# Patient Record
Sex: FEMALE | URBAN - METROPOLITAN AREA
[De-identification: names, ages, dates, MRNs, and addresses within clinical notes are randomized per-mention and may not be internally consistent; named-entity substitution may affect disease eponyms.]

---

## 2023-04-12 PROBLEM — M54.40 BACK PAIN OF LUMBAR REGION WITH SCIATICA: Status: ACTIVE | Noted: 2023-04-12

## 2023-04-12 PROBLEM — Z87.2 HISTORY OF URTICARIA: Status: ACTIVE | Noted: 2023-04-12

## 2023-08-02 ENCOUNTER — OFFICE VISIT (OUTPATIENT)
Dept: PRIMARY CARE CLINIC | Age: 48
End: 2023-08-02
Payer: COMMERCIAL

## 2023-08-02 VITALS
WEIGHT: 146.4 LBS | HEIGHT: 68 IN | DIASTOLIC BLOOD PRESSURE: 64 MMHG | SYSTOLIC BLOOD PRESSURE: 102 MMHG | HEART RATE: 64 BPM | BODY MASS INDEX: 22.19 KG/M2 | OXYGEN SATURATION: 97 %

## 2023-08-02 DIAGNOSIS — Z01.84 IMMUNITY STATUS TESTING: Primary | ICD-10-CM

## 2023-08-02 DIAGNOSIS — Z01.84 IMMUNITY STATUS TESTING: ICD-10-CM

## 2023-08-02 DIAGNOSIS — Z23 NEED FOR MMR VACCINE: ICD-10-CM

## 2023-08-02 DIAGNOSIS — Z23 NEED FOR HEPATITIS A IMMUNIZATION: ICD-10-CM

## 2023-08-02 DIAGNOSIS — Z23 NEED FOR TDAP VACCINATION: ICD-10-CM

## 2023-08-02 LAB — HBV SURFACE AB SERPL IA-ACNC: >1000 MIU/ML

## 2023-08-02 PROCEDURE — 99213 OFFICE O/P EST LOW 20 MIN: CPT | Performed by: FAMILY MEDICINE

## 2023-08-02 PROCEDURE — 90472 IMMUNIZATION ADMIN EACH ADD: CPT | Performed by: FAMILY MEDICINE

## 2023-08-02 PROCEDURE — 90632 HEPA VACCINE ADULT IM: CPT | Performed by: FAMILY MEDICINE

## 2023-08-02 PROCEDURE — 90471 IMMUNIZATION ADMIN: CPT | Performed by: FAMILY MEDICINE

## 2023-08-02 PROCEDURE — 90715 TDAP VACCINE 7 YRS/> IM: CPT | Performed by: FAMILY MEDICINE

## 2023-08-02 ASSESSMENT — ENCOUNTER SYMPTOMS
DIARRHEA: 0
COUGH: 0
SORE THROAT: 0
EYE ITCHING: 0
ABDOMINAL PAIN: 0
SHORTNESS OF BREATH: 0
VOMITING: 0
EYE DISCHARGE: 0
TROUBLE SWALLOWING: 0
NAUSEA: 0

## 2023-08-02 NOTE — PROGRESS NOTES
Due to language barrier, an  was present during the history-taking and subsequent discussion (and for part of the physical exam) with this patient. Immunization(s) given during visit:     Immunizations Administered       Name Date Dose Route    Hep A, HAVRIX, VAQTA, (age 19y+), IM, 1mL 8/2/2023 1 mL Intramuscular    Site: Deltoid- Left    Lot: [id number]    NDC: 09267-688-81    TDaP, ADACEL (age 6y-58y), BOOSTRIX (age 10y+), IM, 0.5mL 8/2/2023 0.5 mL Intramuscular    Site: Deltoid- Right    Lot: [id number]    NDC: 21507-144-57             Patient instructed to remain in clinic for 20 minutes after injection and was advised to report any adverse reaction to me immediately.

## 2023-08-02 NOTE — PROGRESS NOTES
Jyothi Edwards (:  1975) is a 56 y.o. female,Established patient, here for evaluation of the following chief complaint(s):  Follow-up      ASSESSMENT/PLAN:  1. Immunity status testing  -     Mumps, Measles, Rubella, and Varicella Zoster, IgG; Future  -     Hepatitis B Surface Antibody; Future  2. Need for Tdap vaccination  -     Tdap, BOOSTRIX, (age 8 yrs+), IM  3. Need for hepatitis A immunization  -     Hep A, HAVRIX, (age 23 yrs+), IM      No follow-ups on file. SUBJECTIVE/OBJECTIVE:  HPI    Patient presents to get an updated vaccine record  - might need to check titers  - had some of these immunizations in [facility] but does not have record of it   -Some vaccines, as discussed with patient and , are \"out of age\" for patient  -We can check titers for and they are agreeable to that however they are aware that if she does not have immunity she might need to come back to get these immunizations  -Other immunizations can be administered at the clinic today  -They are agreeable    Review of Systems   Constitutional:  Negative for appetite change, chills, fatigue and fever. HENT:  Negative for congestion, ear pain, sneezing, sore throat and trouble swallowing. Eyes:  Negative for discharge and itching. Respiratory:  Negative for cough and shortness of breath. Cardiovascular:  Negative for chest pain and leg swelling. Gastrointestinal:  Negative for abdominal pain, diarrhea, nausea and vomiting. Genitourinary:  Negative for dysuria and urgency. Musculoskeletal:  Negative for joint swelling and neck pain. Neurological:  Negative for light-headedness and headaches. Psychiatric/Behavioral:  Negative for dysphoric mood. The patient is not nervous/anxious. Physical Exam  Constitutional:       General: She is not in acute distress. Appearance: Normal appearance. HENT:      Head: Normocephalic and atraumatic. Nose: Nose normal. No congestion or rhinorrhea.    Eyes:      General:

## 2023-08-03 LAB
MEV IGG SER QL IA: NORMAL
MUV IGG SER QL IA: NORMAL
RUBV IGG SERPL QL IA: NORMAL
VZV IGG SER QL IA: NORMAL

## 2023-08-04 ENCOUNTER — NURSE ONLY (OUTPATIENT)
Dept: PRIMARY CARE CLINIC | Age: 48
End: 2023-08-04
Payer: COMMERCIAL

## 2023-08-04 DIAGNOSIS — Z23 NEED FOR MMR VACCINE: Primary | ICD-10-CM

## 2023-08-04 PROCEDURE — 90471 IMMUNIZATION ADMIN: CPT | Performed by: FAMILY MEDICINE

## 2023-08-04 PROCEDURE — 90707 MMR VACCINE SC: CPT | Performed by: FAMILY MEDICINE

## 2023-12-19 ENCOUNTER — TELEPHONE (OUTPATIENT)
Dept: PRIMARY CARE CLINIC | Age: 48
End: 2023-12-19

## 2023-12-19 NOTE — TELEPHONE ENCOUNTER
Patient had a physical on 4/12 and lab work was done at that time    Please cancel the 12/29 appointment for annual wellness and have them reschedule it for April 2024 or later  However if they need to be seen for an acute care visit they can keep the 12/29 appointment but will need to be to 15 minutes instead    Dr. Christine